# Patient Record
Sex: FEMALE | Race: WHITE | NOT HISPANIC OR LATINO | URBAN - METROPOLITAN AREA
[De-identification: names, ages, dates, MRNs, and addresses within clinical notes are randomized per-mention and may not be internally consistent; named-entity substitution may affect disease eponyms.]

---

## 2018-01-15 ENCOUNTER — APPOINTMENT (RX ONLY)
Dept: URBAN - METROPOLITAN AREA CLINIC 96 | Facility: CLINIC | Age: 53
Setting detail: DERMATOLOGY
End: 2018-01-15

## 2018-01-15 DIAGNOSIS — L71.8 OTHER ROSACEA: ICD-10-CM

## 2018-01-15 DIAGNOSIS — L57.0 ACTINIC KERATOSIS: ICD-10-CM

## 2018-01-15 DIAGNOSIS — Z41.9 ENCOUNTER FOR PROCEDURE FOR PURPOSES OTHER THAN REMEDYING HEALTH STATE, UNSPECIFIED: ICD-10-CM

## 2018-01-15 PROCEDURE — 99213 OFFICE O/P EST LOW 20 MIN: CPT | Mod: 25

## 2018-01-15 PROCEDURE — ? TCA CHEMICAL PEEL

## 2018-01-15 PROCEDURE — ? LIQUID NITROGEN

## 2018-01-15 PROCEDURE — ? PRESCRIPTION

## 2018-01-15 PROCEDURE — ? COUNSELING

## 2018-01-15 PROCEDURE — 17004 DESTROY PREMAL LESIONS 15/>: CPT

## 2018-01-15 RX ORDER — METRONIDAZOLE 10 MG/G
GEL TOPICAL QHS
Qty: 1 | Refills: 0 | Status: ERX | COMMUNITY
Start: 2018-01-15

## 2018-01-15 RX ADMIN — METRONIDAZOLE: 10 GEL TOPICAL at 21:54

## 2018-01-15 ASSESSMENT — LOCATION SIMPLE DESCRIPTION DERM
LOCATION SIMPLE: LEFT CHEEK
LOCATION SIMPLE: LEFT UPPER BACK
LOCATION SIMPLE: LEFT HAND

## 2018-01-15 ASSESSMENT — LOCATION DETAILED DESCRIPTION DERM
LOCATION DETAILED: LEFT MEDIAL UPPER BACK
LOCATION DETAILED: LEFT ULNAR DORSAL HAND
LOCATION DETAILED: LEFT INFERIOR CENTRAL MALAR CHEEK

## 2018-01-15 ASSESSMENT — LOCATION ZONE DERM
LOCATION ZONE: TRUNK
LOCATION ZONE: HAND
LOCATION ZONE: FACE

## 2018-01-15 NOTE — PROCEDURE: LIQUID NITROGEN
Total Number Of Aks Treated: 21
Duration Of Freeze Thaw-Cycle (Seconds): 0
Render Post-Care Instructions In Note?: no
Consent: The patient's consent was obtained including but not limited to risks of crusting, scabbing, blistering, scarring, darker or lighter pigmentary change, recurrence, incomplete removal and infection.
Detail Level: Zone
Post-Care Instructions: I reviewed with the patient in detail post-care instructions. Patient is to wear sunprotection, and avoid picking at any of the treated lesions. Pt may apply Vaseline to crusted or scabbing areas.
Number Of Freeze-Thaw Cycles: 2 freeze-thaw cycles

## 2018-01-15 NOTE — PROCEDURE: TCA CHEMICAL PEEL
Treatment Number: 0
Post-Care Instructions: I reviewed with the patient in detail post-care instructions. Patient should avoid sun exposure and wear sun protection.
Number Of Coats: 3
Time (Mins): 2
Detail Level: Zone
Consent: Prior to the procedure, written consent was obtained and risks were reviewed, including but not limited to: redness, peeling, blistering, pigmentary change, scarring, infection, and pain.
Prep: The treated area was degreased with pre-peel cleanser, and vaseline was applied for protection of mucous membranes.
Chemical Peel: 10% TCA
Post Peel Care: After the procedure, the treatment area was washed with soap and water, and a post-peel cream was applied. Sun protection and post-care instructions were reviewed with the patient. 12.5%Chemical  peel was done on face by Shell Grady.

## 2018-02-21 ENCOUNTER — APPOINTMENT (RX ONLY)
Dept: URBAN - METROPOLITAN AREA CLINIC 96 | Facility: CLINIC | Age: 53
Setting detail: DERMATOLOGY
End: 2018-02-21

## 2018-02-21 DIAGNOSIS — D22 MELANOCYTIC NEVI: ICD-10-CM

## 2018-02-21 DIAGNOSIS — L71.8 OTHER ROSACEA: ICD-10-CM

## 2018-02-21 DIAGNOSIS — Z41.9 ENCOUNTER FOR PROCEDURE FOR PURPOSES OTHER THAN REMEDYING HEALTH STATE, UNSPECIFIED: ICD-10-CM

## 2018-02-21 PROBLEM — D22.9 MELANOCYTIC NEVI, UNSPECIFIED: Status: ACTIVE | Noted: 2018-02-21

## 2018-02-21 PROCEDURE — ? COUNSELING

## 2018-02-21 PROCEDURE — ? PRESCRIPTION

## 2018-02-21 PROCEDURE — 99213 OFFICE O/P EST LOW 20 MIN: CPT

## 2018-02-21 PROCEDURE — ? TCA CHEMICAL PEEL

## 2018-02-21 ASSESSMENT — LOCATION SIMPLE DESCRIPTION DERM
LOCATION SIMPLE: RIGHT CHEEK
LOCATION SIMPLE: LEFT CHEEK
LOCATION SIMPLE: SUPERIOR FOREHEAD
LOCATION SIMPLE: LEFT LIP

## 2018-02-21 ASSESSMENT — LOCATION ZONE DERM
LOCATION ZONE: FACE
LOCATION ZONE: LIP

## 2018-02-21 ASSESSMENT — LOCATION DETAILED DESCRIPTION DERM
LOCATION DETAILED: RIGHT CENTRAL MALAR CHEEK
LOCATION DETAILED: LEFT CENTRAL MALAR CHEEK
LOCATION DETAILED: LEFT LOWER CUTANEOUS LIP
LOCATION DETAILED: SUPERIOR MID FOREHEAD

## 2018-02-21 NOTE — PROCEDURE: TCA CHEMICAL PEEL
Consent: Prior to the procedure, written consent was obtained and risks were reviewed, including but not limited to: redness, peeling, blistering, pigmentary change, scarring, infection, and pain.
Post Peel Care: After the procedure, the treatment area was washed with soap and water, and a post-peel cream was applied. Sun protection and post-care instructions were reviewed with the patient.
Number Of Coats: 3
Treatment Number: 0
Time (Mins): 2
Prep: The treated area was degreased with pre-peel cleanser, and vaseline was applied for protection of mucous membranes.
Detail Level: Zone
Chemical Peel: 10% TCA
Post-Care Instructions: I reviewed with the patient in detail post-care instructions. Patient should avoid sun exposure and wear sun protection.

## 2018-02-22 RX ORDER — METRONIDAZOLE 10 MG/G
GEL TOPICAL BID
Qty: 1 | Refills: 1 | Status: ERX

## 2018-04-07 ENCOUNTER — APPOINTMENT (RX ONLY)
Dept: URBAN - METROPOLITAN AREA CLINIC 48 | Facility: CLINIC | Age: 53
Setting detail: DERMATOLOGY
End: 2018-04-07

## 2018-04-07 DIAGNOSIS — Z41.9 ENCOUNTER FOR PROCEDURE FOR PURPOSES OTHER THAN REMEDYING HEALTH STATE, UNSPECIFIED: ICD-10-CM

## 2018-04-07 DIAGNOSIS — L71.8 OTHER ROSACEA: ICD-10-CM

## 2018-04-07 PROCEDURE — ? TCA CHEMICAL PEEL

## 2018-04-07 PROCEDURE — ? COUNSELING

## 2018-04-07 PROCEDURE — 99213 OFFICE O/P EST LOW 20 MIN: CPT

## 2018-04-07 PROCEDURE — ? PRESCRIPTION

## 2018-04-07 RX ORDER — OXYMETAZOLINE HYDROCHLORIDE 10 MG/G
CREAM TOPICAL QAM
Qty: 1 | Refills: 0 | Status: ERX | COMMUNITY
Start: 2018-04-07

## 2018-04-07 RX ORDER — IVERMECTIN 10 MG/G
CREAM TOPICAL QHS
Qty: 1 | Refills: 0 | Status: ERX | COMMUNITY
Start: 2018-04-07

## 2018-04-07 RX ADMIN — OXYMETAZOLINE HYDROCHLORIDE: 10 CREAM TOPICAL at 00:00

## 2018-04-07 RX ADMIN — IVERMECTIN: 10 CREAM TOPICAL at 00:00

## 2018-04-07 ASSESSMENT — LOCATION DETAILED DESCRIPTION DERM: LOCATION DETAILED: INFERIOR MID FOREHEAD

## 2018-04-07 ASSESSMENT — LOCATION SIMPLE DESCRIPTION DERM: LOCATION SIMPLE: INFERIOR FOREHEAD

## 2018-04-07 ASSESSMENT — LOCATION ZONE DERM: LOCATION ZONE: FACE

## 2018-04-07 NOTE — PROCEDURE: MIPS QUALITY
Quality 226: Preventive Care And Screening: Tobacco Use: Screening And Cessation Intervention: Patient screened for tobacco and is a smoker AND received Cessation Counseling
Quality 130: Documentation Of Current Medications In The Medical Record: Current Medications not Documented, Patient not Eligible
Quality 131: Pain Assessment And Follow-Up: Pain assessment using a standardized tool is documented as negative, no follow-up plan required
Quality 431: Preventive Care And Screening: Unhealthy Alcohol Use - Screening: Patient screened for unhealthy alcohol use using a single question and scores less than 2 times per year
Detail Level: Zone

## 2018-04-07 NOTE — PROCEDURE: TCA CHEMICAL PEEL
Prep: The treated area was degreased with pre-peel cleanser, and vaseline was applied for protection of mucous membranes.
Post-Care Instructions: I reviewed with the patient in detail post-care instructions. Patient should avoid sun exposure and wear sun protection.
Chemical Peel: 17.5% TCA
Time (Mins): 2
Frost (0,1+,2+,3+,4+): 0
Consent: Prior to the procedure, written consent was obtained and risks were reviewed, including but not limited to: redness, peeling, blistering, pigmentary change, scarring, infection, and pain.
Post Peel Care: After the procedure, the treatment area was washed with soap and water, and a post-peel cream was applied. Sun protection and post-care instructions were reviewed with the patient.  JOVANY
Detail Level: Zone

## 2023-11-15 ENCOUNTER — OFFICE (OUTPATIENT)
Dept: URBAN - METROPOLITAN AREA CLINIC 106 | Facility: CLINIC | Age: 58
End: 2023-11-15

## 2023-11-15 DIAGNOSIS — R19.7 DIARRHEA (UNSPECIFIED): ICD-10-CM

## 2023-11-15 DIAGNOSIS — K57.32 DIVERTICULITIS, COLON: ICD-10-CM

## 2023-11-15 DIAGNOSIS — Z12.11 SCREENING FOR COLON CANCER: ICD-10-CM

## 2023-11-15 DIAGNOSIS — R10.32 LLQ PAIN: ICD-10-CM

## 2023-11-15 DIAGNOSIS — K57.30 DIVERTICULOSIS, COLON: ICD-10-CM

## 2023-11-15 DIAGNOSIS — Z86.16 PERSONAL HISTORY OF COVID-19: ICD-10-CM

## 2023-11-15 PROCEDURE — 99204 OFFICE O/P NEW MOD 45 MIN: CPT | Performed by: INTERNAL MEDICINE

## 2023-11-15 PROCEDURE — G0406 INPT/TELE FOLLOW UP 15: HCPCS | Performed by: INTERNAL MEDICINE
